# Patient Record
Sex: FEMALE | Race: WHITE
[De-identification: names, ages, dates, MRNs, and addresses within clinical notes are randomized per-mention and may not be internally consistent; named-entity substitution may affect disease eponyms.]

---

## 2020-03-17 ENCOUNTER — HOSPITAL ENCOUNTER (EMERGENCY)
Dept: HOSPITAL 25 - UCEAST | Age: 70
Discharge: HOME | End: 2020-03-17
Payer: MEDICARE

## 2020-03-17 VITALS — SYSTOLIC BLOOD PRESSURE: 154 MMHG | DIASTOLIC BLOOD PRESSURE: 90 MMHG

## 2020-03-17 DIAGNOSIS — J40: Primary | ICD-10-CM

## 2020-03-17 DIAGNOSIS — Z87.891: ICD-10-CM

## 2020-03-17 DIAGNOSIS — R09.81: ICD-10-CM

## 2020-03-17 PROCEDURE — G0463 HOSPITAL OUTPT CLINIC VISIT: HCPCS

## 2020-03-17 PROCEDURE — 99202 OFFICE O/P NEW SF 15 MIN: CPT

## 2020-03-17 PROCEDURE — 71046 X-RAY EXAM CHEST 2 VIEWS: CPT

## 2020-03-17 NOTE — UC
Respiratory Complaint HPI





- HPI Summary


HPI Summary: 





70 yo female presents with productive cough. She tells me that for the last 4 

days she has had a productive cough with wheezing, sore throat, and sinus 

congestion. She has not taken anything OTC for her symptoms. Feels better when 

she is able to cough up mucus. She has not traveled or had any known contacts 

with COVID. She does not smoke. Denies fever, chills, SOB, chest pain, 

abdominal pain, n/v





- History of Current Complaint


Chief Complaint: UCGeneralIllness


Stated Complaint: COUGH,RUNNY NOSE,SLIGHT FEVER


Time Seen by Provider: 03/17/20 10:11


Hx Obtained From: Patient


Onset/Duration: Sudden Onset


Severity Currently: None


Pain Intensity: 0





- Allergies/Home Medications


Allergies/Adverse Reactions: 


 Allergies











Allergy/AdvReac Type Severity Reaction Status Date / Time


 


No Known Allergies Allergy   Verified 03/17/20 10:09











Home Medications: 


 Home Medications





Ibuprofen TAB* [Advil TAB*] 2 tab PO ONCE PRN 11/17/12 [History Confirmed 03/17/ 20]


Azithromycin TAB* [Zithromax TAB (Z-MICHEAL) 250 mg #6 tabs] 2 tab PO .TODAY, THEN 

1 DAILY #1 micheal 03/17/20 [Rx]


guaiFENesin ER TAB [Mucinex*] 600 mg PO BID #20 tab.er 03/17/20 [Rx]











PMH/Surg Hx/FS Hx/Imm Hx





- Additional Past Medical History


Additional PMH: 





None





- Surgical History


Surgical History: Yes


Surgery Procedure, Year, and Place: parathyroid removed





- Family History


Known Family History: Positive: Hypertension





- Social History


Lives: With Family


Alcohol Use: Occasionally


Alcohol Amount: 1-2 beers daily


Substance Use Type: None


Smoking Status (MU): Former Smoker


Type: Cigarettes


Length of Time of Smoking/Using Tobacco: quit 7 years ago





Review of Systems


All Other Systems Reviewed And Are Negative: No


Constitutional: Positive: Negative


Skin: Positive: Negative


Eyes: Positive: Negative


ENT: Positive: Sore Throat, Sinus Congestion


Respiratory: Positive: Cough


Cardiovascular: Positive: Negative


Gastrointestinal: Positive: Negative


Neurological/Mental Status: Positive: Negative


Psychological: Positive: Negative





Physical Exam





- Summary


Physical Exam Summary: 





GENERAL: NAD. WDWN. No pain distress.


SKIN: No rashes, sores, lesions, or open wounds.


HEENT:


            Head: AT/NC


            Eyes: Conjunctiva clear without inflammation or discharge.


            Ears: Hearing grossly normal. TMs intact, no bulging, erythema, or 

edema. 


            Nose: Nasal mucosa mildly swollen and erythematous with yellow/

clear discharge. TTP maxillary and frontal sinus. Positive post nasal drip


            Throat: Posterior oropharynx without exudates, erythema, or 

tonsillar enlargement.  Uvula midline.


NECK: Supple. Nontender. No lymphadenopathy. 


CHEST: Mild wheezing throughout. No r/r. No accessory muscle use. Breathing 

comfortably and in no distress.


CV:  RRR. Pulses intact. Cap refill <2seconds


NEURO: Alert. 


PSYCH: Age appropriate behavior.


Triage Information Reviewed: Yes


Vital Signs: 


 Initial Vital Signs











Temp  98.7 F   03/17/20 10:05


 


Pulse  95   03/17/20 10:05


 


Resp  18   03/17/20 10:05


 


BP  154/90   03/17/20 10:05


 


Pulse Ox  93   03/17/20 10:05











Vital Signs Reviewed: Yes





Diagnostics





- Radiology


  ** CXR


Radiology Interpretation Completed By: Radiologist


Summary of Radiographic Findings: IMPRESSION: HYPERINFLATION, CONSISTENT WITH 

COPD. NO ACTIVE CARDIOPULMONARY DISEASE.





Respiratory Course/Dx





- Course


Course Of Treatment: 





CXR negative.


Exam consistent with bronchitis/sinusitis. Low suspicion for COVID at this time 

as she has no fever, contact, SOB, or travel and has other symptoms making 

COVID less likely.


I discussed giving her a duoneb treatment in the clinic, but she declined as 

she does not like the side effects of shaky and elevated HR. 


Will treat for bronchitis and encourage her to f/u with PCP if symptoms worsen 

or do not improve.





- Differential Dx/Diagnosis


Provider Diagnosis: 


 Bronchitis








Discharge ED





- Sign-Out/Discharge


Documenting (check all that apply): Patient Departure


All imaging exams completed and their final reports reviewed: Yes





- Discharge Plan


Condition: Stable


Disposition: HOME


Prescriptions: 


Azithromycin TAB* [Zithromax TAB (Z-MICHEAL) 250 mg #6 tabs] 2 tab PO .TODAY, THEN 

1 DAILY #1 micheal


guaiFENesin ER TAB [Mucinex*] 600 mg PO BID #20 tab.er


Patient Education Materials:  Acute Bronchitis (ED)


Referrals: 


Lalo Rojo MD [Primary Care Provider] - 


Additional Instructions: 


If you develop a fever, shortness of breath, chest pain, new or worsening 

symptoms - please call your PCP or go to the ED immediately.


 





Your blood pressure was high at todays visit. Please see your primary provider 

within 4 weeks for recheck and re-evaluation.








- Billing Disposition and Condition


Condition: STABLE


Disposition: Home





- Attestation Statements


Provider Attestation: 





This patient was not seen by me.


I was available for consult.


Chart reviewed.


KAREEM